# Patient Record
Sex: MALE | Race: WHITE | NOT HISPANIC OR LATINO
[De-identification: names, ages, dates, MRNs, and addresses within clinical notes are randomized per-mention and may not be internally consistent; named-entity substitution may affect disease eponyms.]

---

## 2017-04-11 ENCOUNTER — TRANSCRIPTION ENCOUNTER (OUTPATIENT)
Age: 27
End: 2017-04-11

## 2017-10-14 ENCOUNTER — TRANSCRIPTION ENCOUNTER (OUTPATIENT)
Age: 27
End: 2017-10-14

## 2018-02-12 ENCOUNTER — APPOINTMENT (OUTPATIENT)
Dept: PULMONOLOGY | Facility: CLINIC | Age: 28
End: 2018-02-12
Payer: COMMERCIAL

## 2018-02-12 VITALS
OXYGEN SATURATION: 98 % | DIASTOLIC BLOOD PRESSURE: 60 MMHG | HEIGHT: 69 IN | BODY MASS INDEX: 25.77 KG/M2 | HEART RATE: 84 BPM | WEIGHT: 174 LBS | SYSTOLIC BLOOD PRESSURE: 130 MMHG

## 2018-02-12 DIAGNOSIS — Z82.49 FAMILY HISTORY OF ISCHEMIC HEART DISEASE AND OTHER DISEASES OF THE CIRCULATORY SYSTEM: ICD-10-CM

## 2018-02-12 PROCEDURE — 99204 OFFICE O/P NEW MOD 45 MIN: CPT | Mod: 25

## 2018-02-12 PROCEDURE — 94664 DEMO&/EVAL PT USE INHALER: CPT | Mod: 59

## 2018-02-12 PROCEDURE — 94060 EVALUATION OF WHEEZING: CPT

## 2018-02-12 RX ORDER — AZELASTINE HYDROCHLORIDE AND FLUTICASONE PROPIONATE 137; 50 UG/1; UG/1
137-50 SPRAY, METERED NASAL TWICE DAILY
Qty: 1 | Refills: 5 | Status: DISCONTINUED | COMMUNITY
Start: 2018-02-12 | End: 2018-02-12

## 2018-03-12 ENCOUNTER — RX RENEWAL (OUTPATIENT)
Age: 28
End: 2018-03-12

## 2018-04-02 ENCOUNTER — APPOINTMENT (OUTPATIENT)
Dept: PULMONOLOGY | Facility: CLINIC | Age: 28
End: 2018-04-02
Payer: COMMERCIAL

## 2018-04-02 VITALS
DIASTOLIC BLOOD PRESSURE: 70 MMHG | OXYGEN SATURATION: 95 % | RESPIRATION RATE: 16 BRPM | SYSTOLIC BLOOD PRESSURE: 112 MMHG | HEART RATE: 82 BPM

## 2018-04-02 VITALS — WEIGHT: 178 LBS | BODY MASS INDEX: 26.29 KG/M2

## 2018-04-02 DIAGNOSIS — J32.9 CHRONIC SINUSITIS, UNSPECIFIED: ICD-10-CM

## 2018-04-02 PROCEDURE — 94664 DEMO&/EVAL PT USE INHALER: CPT | Mod: 59

## 2018-04-02 PROCEDURE — 99214 OFFICE O/P EST MOD 30 MIN: CPT | Mod: 25

## 2018-04-02 PROCEDURE — 94060 EVALUATION OF WHEEZING: CPT

## 2018-04-02 RX ORDER — PREDNISONE 10 MG/1
10 TABLET ORAL
Refills: 0 | Status: DISCONTINUED | COMMUNITY
End: 2018-04-02

## 2018-05-01 ENCOUNTER — APPOINTMENT (OUTPATIENT)
Dept: PULMONOLOGY | Facility: CLINIC | Age: 28
End: 2018-05-01
Payer: COMMERCIAL

## 2018-05-01 VITALS — DIASTOLIC BLOOD PRESSURE: 60 MMHG | OXYGEN SATURATION: 97 % | SYSTOLIC BLOOD PRESSURE: 138 MMHG | HEART RATE: 77 BPM

## 2018-05-01 VITALS — WEIGHT: 174 LBS | BODY MASS INDEX: 25.7 KG/M2

## 2018-05-01 PROCEDURE — 94010 BREATHING CAPACITY TEST: CPT

## 2018-05-01 PROCEDURE — 99214 OFFICE O/P EST MOD 30 MIN: CPT | Mod: 25

## 2018-05-01 RX ORDER — PREDNISONE 20 MG/1
20 TABLET ORAL
Qty: 10 | Refills: 0 | Status: DISCONTINUED | COMMUNITY
Start: 2018-03-26 | End: 2018-05-01

## 2018-05-01 RX ORDER — ALPRAZOLAM 0.5 MG/1
0.5 TABLET ORAL
Qty: 45 | Refills: 0 | Status: DISCONTINUED | COMMUNITY
Start: 2017-12-13 | End: 2018-05-01

## 2018-05-01 RX ORDER — ALPRAZOLAM 1 MG/1
1 TABLET ORAL
Qty: 60 | Refills: 0 | Status: ACTIVE | COMMUNITY
Start: 2018-04-12

## 2018-05-01 RX ORDER — PREDNISONE 50 MG/1
50 TABLET ORAL
Qty: 5 | Refills: 0 | Status: DISCONTINUED | COMMUNITY
Start: 2018-02-09 | End: 2018-05-01

## 2018-05-01 RX ORDER — AZITHROMYCIN 250 MG/1
250 TABLET, FILM COATED ORAL
Qty: 6 | Refills: 0 | Status: DISCONTINUED | COMMUNITY
Start: 2018-02-09 | End: 2018-05-01

## 2018-05-01 RX ORDER — CLARITHROMYCIN 500 MG/1
500 TABLET, FILM COATED ORAL
Qty: 20 | Refills: 0 | Status: DISCONTINUED | COMMUNITY
Start: 2018-03-26 | End: 2018-05-01

## 2018-05-01 RX ORDER — ALPRAZOLAM 0.25 MG/1
0.25 TABLET ORAL
Qty: 10 | Refills: 0 | Status: DISCONTINUED | COMMUNITY
Start: 2018-02-06 | End: 2018-05-01

## 2018-05-22 ENCOUNTER — MEDICATION RENEWAL (OUTPATIENT)
Age: 28
End: 2018-05-22

## 2018-08-02 ENCOUNTER — APPOINTMENT (OUTPATIENT)
Dept: PULMONOLOGY | Facility: CLINIC | Age: 28
End: 2018-08-02

## 2018-12-04 ENCOUNTER — APPOINTMENT (OUTPATIENT)
Dept: OTOLARYNGOLOGY | Facility: CLINIC | Age: 28
End: 2018-12-04
Payer: COMMERCIAL

## 2018-12-04 VITALS
WEIGHT: 174 LBS | BODY MASS INDEX: 25.77 KG/M2 | SYSTOLIC BLOOD PRESSURE: 132 MMHG | DIASTOLIC BLOOD PRESSURE: 69 MMHG | OXYGEN SATURATION: 98 % | HEART RATE: 84 BPM | HEIGHT: 69 IN

## 2018-12-04 DIAGNOSIS — J34.2 DEVIATED NASAL SEPTUM: ICD-10-CM

## 2018-12-04 DIAGNOSIS — Z87.09 PERSONAL HISTORY OF OTHER DISEASES OF THE RESPIRATORY SYSTEM: ICD-10-CM

## 2018-12-04 DIAGNOSIS — J32.2 CHRONIC ETHMOIDAL SINUSITIS: ICD-10-CM

## 2018-12-04 PROCEDURE — 99204 OFFICE O/P NEW MOD 45 MIN: CPT | Mod: 25

## 2018-12-04 PROCEDURE — 31231 NASAL ENDOSCOPY DX: CPT

## 2019-01-07 ENCOUNTER — FORM ENCOUNTER (OUTPATIENT)
Age: 29
End: 2019-01-07

## 2019-01-08 ENCOUNTER — OUTPATIENT (OUTPATIENT)
Dept: OUTPATIENT SERVICES | Facility: HOSPITAL | Age: 29
LOS: 1 days | End: 2019-01-08
Payer: COMMERCIAL

## 2019-01-08 ENCOUNTER — APPOINTMENT (OUTPATIENT)
Dept: OTOLARYNGOLOGY | Facility: CLINIC | Age: 29
End: 2019-01-08
Payer: COMMERCIAL

## 2019-01-08 ENCOUNTER — APPOINTMENT (OUTPATIENT)
Dept: CT IMAGING | Facility: HOSPITAL | Age: 29
End: 2019-01-08

## 2019-01-08 VITALS
HEART RATE: 68 BPM | BODY MASS INDEX: 25.77 KG/M2 | SYSTOLIC BLOOD PRESSURE: 148 MMHG | WEIGHT: 174 LBS | HEIGHT: 69 IN | OXYGEN SATURATION: 98 % | DIASTOLIC BLOOD PRESSURE: 74 MMHG

## 2019-01-08 PROBLEM — J34.2 DEVIATED SEPTUM: Status: ACTIVE | Noted: 2019-01-08

## 2019-01-08 PROBLEM — J32.2 SINUSITIS CHRONIC, ETHMOIDAL: Status: ACTIVE | Noted: 2019-01-08

## 2019-01-08 PROCEDURE — 70486 CT MAXILLOFACIAL W/O DYE: CPT

## 2019-01-08 PROCEDURE — 31231 NASAL ENDOSCOPY DX: CPT

## 2019-01-08 PROCEDURE — 70486 CT MAXILLOFACIAL W/O DYE: CPT | Mod: 26

## 2019-01-08 PROCEDURE — 99214 OFFICE O/P EST MOD 30 MIN: CPT | Mod: 25

## 2019-01-08 NOTE — DATA REVIEWED
[de-identified] : 1-8-2019- CT imaging performed today shows right anterior ethmoid sinusitis, thickening of the mucous membrane lining the maxillary sinus, severe deviation of the septum to the right and hypertrophic inferior turbinate on the left which all contributing to the patient's airway obstruction. Both reviewed with patient and family.

## 2019-01-08 NOTE — PROCEDURE
[FreeTextEntry6] : Nasal endoscopy performed under local anesthesia to evaluate the airway and sinuses. This shows we are complete obstruction of the right nose by deviation of the septum and anterior obstruction of the left nose and hypertrophic inferior turbinate. Or significant erythema of the mucous membrane covering the lateral nasal wall which suggests either recent or recovering infection.

## 2019-01-08 NOTE — HISTORY OF PRESENT ILLNESS
[de-identified] : 28-year-old asthmatic patient under the care of Dr. Devries now referred for treatment of episodic sinusitis which consisted of facial pain and purulent posterior rhinorrhea. Patient has seen another surgeon who recommended sinus and some form of nasal surgery. Apparently after finishing 2 weeks of Biaxin he had some improvement but required further care. Currently he feels he feels minimally if at all improved and notes posterior purulent rhinorrhea with cough and nasal airway symptoms. As he has asthma he follows his oxygen saturation and is currently 90-90 one several weeks ago and since has improved to 97%. [FreeTextEntry1] : 1-8-2019- Patient returns for evaluation of acute chronic rhinosinusitis and nasal airway traction. CT reviewed today with patient shows deviation of the septum severely to the right and bilateral anterior ethmoid opacification and antral inferior sinus thickening. Above discussed with patient. As he finds a sinus disease exacerbates his asthma he is anxious to have further care.

## 2019-01-08 NOTE — ASSESSMENT
[FreeTextEntry1] : Patient seen after undergoing CT scan of the sinuses. This shows right anterior ethmoid sinusitis and maxillary sinus disease. Patient has severe deviation of the septum to the right which is essentially blocking his airway. He also has hypertrophy of the left inferior turbinate which contributes to his nasal airway disease. Finally there significant erythema of the mucous membrane of the lateral nasal wall and septum suggesting recent sinus infection.\par \par Given these findings we are recommending the patient begin prednisone for the inflammation and we believe he would benefit from undergoing a septoplasty with submucous resection of the left inferior turbinate and right partial ethmoidectomy and antrostomy. Image guidance will be utilized. Consent given.

## 2019-01-28 ENCOUNTER — APPOINTMENT (OUTPATIENT)
Dept: OTOLARYNGOLOGY | Facility: AMBULATORY SURGERY CENTER | Age: 29
End: 2019-01-28

## 2019-03-19 ENCOUNTER — MEDICATION RENEWAL (OUTPATIENT)
Age: 29
End: 2019-03-19

## 2019-04-15 ENCOUNTER — RX RENEWAL (OUTPATIENT)
Age: 29
End: 2019-04-15

## 2019-04-23 ENCOUNTER — RX RENEWAL (OUTPATIENT)
Age: 29
End: 2019-04-23

## 2019-05-07 ENCOUNTER — OTHER (OUTPATIENT)
Age: 29
End: 2019-05-07

## 2019-05-07 ENCOUNTER — TRANSCRIPTION ENCOUNTER (OUTPATIENT)
Age: 29
End: 2019-05-07

## 2019-05-09 ENCOUNTER — APPOINTMENT (OUTPATIENT)
Dept: PULMONOLOGY | Facility: CLINIC | Age: 29
End: 2019-05-09
Payer: COMMERCIAL

## 2019-05-09 VITALS — HEART RATE: 66 BPM | OXYGEN SATURATION: 98 % | SYSTOLIC BLOOD PRESSURE: 110 MMHG | DIASTOLIC BLOOD PRESSURE: 60 MMHG

## 2019-05-09 DIAGNOSIS — J32.0 CHRONIC MAXILLARY SINUSITIS: ICD-10-CM

## 2019-05-09 PROCEDURE — 99215 OFFICE O/P EST HI 40 MIN: CPT | Mod: 25

## 2019-05-09 PROCEDURE — 94010 BREATHING CAPACITY TEST: CPT

## 2019-05-09 RX ORDER — AMOXICILLIN AND CLAVULANATE POTASSIUM 875; 125 MG/1; MG/1
875-125 TABLET, COATED ORAL TWICE DAILY
Qty: 42 | Refills: 1 | Status: DISCONTINUED | COMMUNITY
Start: 2018-12-04 | End: 2019-05-09

## 2019-05-09 RX ORDER — AMOXICILLIN AND CLAVULANATE POTASSIUM 875; 125 MG/1; MG/1
875-125 TABLET, COATED ORAL
Qty: 20 | Refills: 1 | Status: DISCONTINUED | COMMUNITY
Start: 2019-01-08 | End: 2019-05-09

## 2019-05-09 RX ORDER — PREDNISONE 10 MG/1
10 TABLET ORAL
Qty: 25 | Refills: 1 | Status: DISCONTINUED | COMMUNITY
Start: 2018-12-04 | End: 2019-05-09

## 2019-05-09 RX ORDER — PREDNISONE 10 MG/1
10 TABLET ORAL
Qty: 25 | Refills: 1 | Status: DISCONTINUED | COMMUNITY
Start: 2019-01-08 | End: 2019-05-09

## 2019-05-09 RX ORDER — KETOROLAC TROMETHAMINE 10 MG/1
10 TABLET, FILM COATED ORAL 3 TIMES DAILY
Qty: 15 | Refills: 1 | Status: DISCONTINUED | COMMUNITY
Start: 2019-01-08 | End: 2019-05-09

## 2019-05-09 NOTE — CONSULT LETTER
[Dear  ___] : Dear  [unfilled], [Please see my note below.] : Please see my note below. [Courtesy Letter:] : I had the pleasure of seeing your patient, [unfilled], in my office today. [Sincerely,] : Sincerely, [Federico Garza MD] : Federico Garza MD

## 2019-05-09 NOTE — PHYSICAL EXAM
[Normal Appearance] : normal appearance [General Appearance - Well Developed] : well developed [Well Groomed] : well groomed [General Appearance - Well Nourished] : well nourished [General Appearance - In No Acute Distress] : no acute distress [No Deformities] : no deformities [Eyelids - No Xanthelasma] : the eyelids demonstrated no xanthelasmas [Normal Oropharynx] : normal oropharynx [Normal Conjunctiva] : the conjunctiva exhibited no abnormalities [Neck Appearance] : the appearance of the neck was normal [Jugular Venous Distention Increased] : there was no jugular-venous distention [Neck Cervical Mass (___cm)] : no neck mass was observed [Thyroid Diffuse Enlargement] : the thyroid was not enlarged [Thyroid Nodule] : there were no palpable thyroid nodules [Heart Rate And Rhythm] : heart rate and rhythm were normal [Heart Sounds] : normal S1 and S2 [Murmurs] : no murmurs present [Respiration, Rhythm And Depth] : normal respiratory rhythm and effort [Exaggerated Use Of Accessory Muscles For Inspiration] : no accessory muscle use [Auscultation Breath Sounds / Voice Sounds] : lungs were clear to auscultation bilaterally [Abdomen Soft] : soft [Abdomen Mass (___ Cm)] : no abdominal mass palpated [Abdomen Tenderness] : non-tender [Abnormal Walk] : normal gait [Gait - Sufficient For Exercise Testing] : the gait was sufficient for exercise testing [Petechial Hemorrhages (___cm)] : no petechial hemorrhages [Nail Clubbing] : no clubbing of the fingernails [Cyanosis, Localized] : no localized cyanosis [Skin Color & Pigmentation] : normal skin color and pigmentation [] : no rash [No Venous Stasis] : no venous stasis [Skin Lesions] : no skin lesions [No Skin Ulcers] : no skin ulcer [Sensation] : the sensory exam was normal to light touch and pinprick [Deep Tendon Reflexes (DTR)] : deep tendon reflexes were 2+ and symmetric [No Xanthoma] : no  xanthoma was observed [No Focal Deficits] : no focal deficits

## 2019-05-09 NOTE — PROCEDURE
[___%] : personal best [unfilled]U% [___%] : last visit [unfilled]U% [Spirometry] : improving [FreeTextEntry1] : IGE ~1200\par \par EOS: 5.3%\par \par \par \par \par \par EXAM: CT SINUSES \par \par PROCEDURE DATE: 01/08/2019 \par \par \par \par INTERPRETATION: Technique: A subcentimeter axial acquisition was obtained \par through the paranasal sinuses utilizing navigational protocol. Axial, \par sagittal and coronal reformatted images were created. \par \par Contrast: None. \par \par Clinical Information: Sinusitis. Post treatment. \par \par Prior Studies: CT maxillofacial dated 8/21/2015. \par \par Findings: \par \par * Prior Surgery: None. \par \par * Sinuses: There is mucosal thickening in the alveolar recesses of the \par maxillary sinuses and within a few anterior ethmoidal air cells bilaterally. \par There is a small retention cyst versus polyp in the right sphenoid sinus. \par Degree and distribution is similar on comparison with the prior \par maxillofacial CT. \par \par * Sinocranial and Sino-orbital Junctions: The anterior skull base is \par unremarkable. The orbital walls are intact. The sphenoid septum inserts on \par the left carotid canal. \par \par * Ostiomeatal Units: The right ostiomeatal unit is obstructed secondary to \par mucosal thickening in the maxillary ostium and proximal infundibulum. There \par is narrowing of the bilateral maxillary ostia and proximal infundibula \par secondary to prominent infraorbital ethmoidal cells (Natali). \par \par * Frontal Sinus Outflow Tracts: There is narrowing of the bilateral frontal \par sinus outflow tracts by anterior frontoethmoidal cell pneumatization, with \par obstruction on the left secondary to superimposed mucosal thickening. \par \par * Sphenoethmoidal Recesses: Scant mucosal thickening is present at the \par sphenoid ostia. \par \par * Maxillary Teeth: No evidence of contributory odontogenic inflammatory \par disease. \par \par * Nasal Cavity/Nasopharynx: The nasal septum is deviated to the right with \par a bony septal spur that contacts the medial surface of the right inferior \par turbinate. There is pneumatization of the left middle turbinate. No nasal \par polyps or masses. The nasopharynx is unremarkable. \par \par * Orbits: Unremarkable. \par \par * Brain: No hydrocephalus or large intracranial mass lesion. \par \par * Mastoids: Well aerated. \par \par \par IMPRESSION: Mild mucosal thickening in the bilateral maxillary sinuses and \par anterior ethmoidal air cells, as above. There are both soft tissue and \par osseous sites of narrowing of the anterior drainage pathways. There is \par prominent rightward septal deviation with intranasal contact point. \par \par Thank you for the opportunity to participate in the care of this patient." \par \par TANYA PEREZ M.D., RADIOLOGY RESIDENT \par This document has been electronically signed. \par GEORGINA CALDERON M.D., ATTENDING RADIOLOGIST \par This document has been electronically signed. Jan 9 2019 4:46PM \par Chest CAT scan reviewed on PACS with the patient.\par

## 2019-05-09 NOTE — HISTORY OF PRESENT ILLNESS
[Moderate Persistent] : moderate persistent [de-identified] : allergies and seasonal rhinitis [de-identified] : Self discontinued  Anoro  > 6 months ago. Ran out of Arnuity. Sufferred exacerbation 5/7/19 and saw [facility]  and given 50mg /day with improvement.

## 2019-05-09 NOTE — DISCUSSION/SUMMARY
[Improving] : improving [Asthma] : asthma [Medication Changes Per Orders] : Medication changes are as documented in orders [Responding to Treatment] : responding to treatment [Moderate Persistent] : moderate persistent [de-identified] : resume sinus meds. Considering Dupixent

## 2019-06-14 ENCOUNTER — RX RENEWAL (OUTPATIENT)
Age: 29
End: 2019-06-14

## 2019-07-11 ENCOUNTER — APPOINTMENT (OUTPATIENT)
Dept: PULMONOLOGY | Facility: CLINIC | Age: 29
End: 2019-07-11
Payer: COMMERCIAL

## 2019-07-11 VITALS — SYSTOLIC BLOOD PRESSURE: 110 MMHG | OXYGEN SATURATION: 96 % | HEART RATE: 80 BPM | DIASTOLIC BLOOD PRESSURE: 60 MMHG

## 2019-07-11 VITALS — BODY MASS INDEX: 25.84 KG/M2 | WEIGHT: 175 LBS

## 2019-07-11 PROCEDURE — 99214 OFFICE O/P EST MOD 30 MIN: CPT | Mod: 25

## 2019-07-11 PROCEDURE — 94010 BREATHING CAPACITY TEST: CPT

## 2019-07-11 NOTE — PHYSICAL EXAM
[Well Groomed] : well groomed [General Appearance - Well Developed] : well developed [Normal Appearance] : normal appearance [General Appearance - Well Nourished] : well nourished [General Appearance - In No Acute Distress] : no acute distress [No Deformities] : no deformities [Eyelids - No Xanthelasma] : the eyelids demonstrated no xanthelasmas [Normal Conjunctiva] : the conjunctiva exhibited no abnormalities [Normal Oropharynx] : normal oropharynx [Neck Cervical Mass (___cm)] : no neck mass was observed [Neck Appearance] : the appearance of the neck was normal [Thyroid Diffuse Enlargement] : the thyroid was not enlarged [Thyroid Nodule] : there were no palpable thyroid nodules [Jugular Venous Distention Increased] : there was no jugular-venous distention [Murmurs] : no murmurs present [Heart Sounds] : normal S1 and S2 [Heart Rate And Rhythm] : heart rate and rhythm were normal [Auscultation Breath Sounds / Voice Sounds] : lungs were clear to auscultation bilaterally [Exaggerated Use Of Accessory Muscles For Inspiration] : no accessory muscle use [Respiration, Rhythm And Depth] : normal respiratory rhythm and effort [Abdomen Tenderness] : non-tender [Abdomen Soft] : soft [Gait - Sufficient For Exercise Testing] : the gait was sufficient for exercise testing [Abnormal Walk] : normal gait [Abdomen Mass (___ Cm)] : no abdominal mass palpated [Cyanosis, Localized] : no localized cyanosis [Petechial Hemorrhages (___cm)] : no petechial hemorrhages [Nail Clubbing] : no clubbing of the fingernails [Skin Color & Pigmentation] : normal skin color and pigmentation [No Skin Ulcers] : no skin ulcer [No Venous Stasis] : no venous stasis [] : no rash [Skin Lesions] : no skin lesions [No Xanthoma] : no  xanthoma was observed [Deep Tendon Reflexes (DTR)] : deep tendon reflexes were 2+ and symmetric [Sensation] : the sensory exam was normal to light touch and pinprick [No Focal Deficits] : no focal deficits

## 2019-07-11 NOTE — CONSULT LETTER
[Please see my note below.] : Please see my note below. [Dear  ___] : Dear  [unfilled], [Courtesy Letter:] : I had the pleasure of seeing your patient, [unfilled], in my office today. [Federico Garza MD] : Federico Garza MD [Sincerely,] : Sincerely,

## 2019-07-11 NOTE — DISCUSSION/SUMMARY
[Asthma] : asthma [Moderate Persistent] : moderate persistent [Improving] : improving [Responding to Treatment] : responding to treatment [Medication Changes Per Orders] : Medication changes are as documented in orders [de-identified] :  Considering Dupixent

## 2019-07-11 NOTE — PROCEDURE
[___%] : current visit [unfilled]U% [Spirometry] : stable [FreeTextEntry1] : IGE ~1200\par \par EOS: 5.3%\par \par \par \par \par \par EXAM: CT SINUSES \par \par PROCEDURE DATE: 01/08/2019 \par \par \par \par INTERPRETATION: Technique: A subcentimeter axial acquisition was obtained \par through the paranasal sinuses utilizing navigational protocol. Axial, \par sagittal and coronal reformatted images were created. \par \par Contrast: None. \par \par Clinical Information: Sinusitis. Post treatment. \par \par Prior Studies: CT maxillofacial dated 8/21/2015. \par \par Findings: \par \par * Prior Surgery: None. \par \par * Sinuses: There is mucosal thickening in the alveolar recesses of the \par maxillary sinuses and within a few anterior ethmoidal air cells bilaterally. \par There is a small retention cyst versus polyp in the right sphenoid sinus. \par Degree and distribution is similar on comparison with the prior \par maxillofacial CT. \par \par * Sinocranial and Sino-orbital Junctions: The anterior skull base is \par unremarkable. The orbital walls are intact. The sphenoid septum inserts on \par the left carotid canal. \par \par * Ostiomeatal Units: The right ostiomeatal unit is obstructed secondary to \par mucosal thickening in the maxillary ostium and proximal infundibulum. There \par is narrowing of the bilateral maxillary ostia and proximal infundibula \par secondary to prominent infraorbital ethmoidal cells (Natali). \par \par * Frontal Sinus Outflow Tracts: There is narrowing of the bilateral frontal \par sinus outflow tracts by anterior frontoethmoidal cell pneumatization, with \par obstruction on the left secondary to superimposed mucosal thickening. \par \par * Sphenoethmoidal Recesses: Scant mucosal thickening is present at the \par sphenoid ostia. \par \par * Maxillary Teeth: No evidence of contributory odontogenic inflammatory \par disease. \par \par * Nasal Cavity/Nasopharynx: The nasal septum is deviated to the right with \par a bony septal spur that contacts the medial surface of the right inferior \par turbinate. There is pneumatization of the left middle turbinate. No nasal \par polyps or masses. The nasopharynx is unremarkable. \par \par * Orbits: Unremarkable. \par \par * Brain: No hydrocephalus or large intracranial mass lesion. \par \par * Mastoids: Well aerated. \par \par \par IMPRESSION: Mild mucosal thickening in the bilateral maxillary sinuses and \par anterior ethmoidal air cells, as above. There are both soft tissue and \par osseous sites of narrowing of the anterior drainage pathways. There is \par prominent rightward septal deviation with intranasal contact point. \par \par Thank you for the opportunity to participate in the care of this patient." \par \par TANYA PEREZ M.D., RADIOLOGY RESIDENT \par This document has been electronically signed. \par GEORGINA CALDERON M.D., ATTENDING RADIOLOGIST \par This document has been electronically signed. Jan 9 2019 4:46PM \par Chest CAT scan reviewed on PACS with the patient.\par

## 2019-07-11 NOTE — HISTORY OF PRESENT ILLNESS
[Moderate Persistent] : moderate persistent [Well Controlled] : Well controlled [None] : The patient is currently asymptomatic [Adherent] : the patient is adherent with ~his/her~ medication regimen [de-identified] : allergies and seasonal rhinitis

## 2019-12-17 RX ORDER — FLUTICASONE FUROATE 200 UG/1
200 POWDER RESPIRATORY (INHALATION) DAILY
Qty: 3 | Refills: 3 | Status: DISCONTINUED | COMMUNITY
Start: 2018-02-12 | End: 2019-12-17

## 2020-03-02 ENCOUNTER — TRANSCRIPTION ENCOUNTER (OUTPATIENT)
Age: 30
End: 2020-03-02

## 2020-03-31 ENCOUNTER — APPOINTMENT (OUTPATIENT)
Dept: PULMONOLOGY | Facility: CLINIC | Age: 30
End: 2020-03-31
Payer: COMMERCIAL

## 2020-03-31 DIAGNOSIS — J45.909 UNSPECIFIED ASTHMA, UNCOMPLICATED: ICD-10-CM

## 2020-03-31 PROCEDURE — 99213 OFFICE O/P EST LOW 20 MIN: CPT

## 2020-03-31 NOTE — PHYSICAL EXAM
[No Acute Distress] : no acute distress [Well Nourished] : well nourished [Normal Appearance] : normal appearance [Well Groomed] : well groomed

## 2020-03-31 NOTE — DISCUSSION/SUMMARY
[FreeTextEntry1] : 30-year-old male, seen today via TeleHealth for his moderate persistent asthma, as well as questions regarding Covid 19 virus. Patient appears to be asymptomatic with good control of his underlying asthma. Questions were answered regarding his risks and preventative measures for Covid 19 protection.

## 2020-03-31 NOTE — HISTORY OF PRESENT ILLNESS
[Home] : at home, [unfilled] , at the time of the visit. [Medical Office: ([facility])___] : at ~his/her~ medical office located in [place] [Patient] : the patient [TextBox_4] : Telemedicine visit was made after patient consent. He was last seen in this office on 7/11/19. Patient has moderate persistent asthma presently controlled on Qvar, as well as p.r.n. albuterol/ipratropium nebulization, Singulair, Ventolin. He is using his nebulizer 2-3 times a week habitually has had no significant complaints of increased cough, wheeze, sputum, fevers, chills, chest pains. He is overly concerned about the Covid 19 crisis. He has little exposure risk. 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 His girlfriend whom he is living with is a nurse and has been exposed to Covid 19, but is asymptomatic.

## 2020-10-29 DIAGNOSIS — Z23 ENCOUNTER FOR IMMUNIZATION: ICD-10-CM

## 2020-12-11 RX ORDER — ALBUTEROL SULFATE 90 UG/1
108 (90 BASE) AEROSOL, METERED RESPIRATORY (INHALATION)
Qty: 3 | Refills: 3 | Status: ACTIVE | COMMUNITY
Start: 2018-02-12 | End: 1900-01-01

## 2021-01-04 ENCOUNTER — APPOINTMENT (OUTPATIENT)
Dept: PULMONOLOGY | Facility: CLINIC | Age: 31
End: 2021-01-04
Payer: COMMERCIAL

## 2021-01-04 VITALS
OXYGEN SATURATION: 98 % | HEART RATE: 103 BPM | DIASTOLIC BLOOD PRESSURE: 68 MMHG | WEIGHT: 223 LBS | BODY MASS INDEX: 32.93 KG/M2 | SYSTOLIC BLOOD PRESSURE: 112 MMHG

## 2021-01-04 PROCEDURE — 99072 ADDL SUPL MATRL&STAF TM PHE: CPT

## 2021-01-04 PROCEDURE — 99215 OFFICE O/P EST HI 40 MIN: CPT

## 2021-01-04 RX ORDER — NORTRIPTYLINE HYDROCHLORIDE 10 MG/1
10 CAPSULE ORAL
Qty: 30 | Refills: 0 | Status: DISCONTINUED | COMMUNITY
Start: 2018-11-28 | End: 2021-01-04

## 2021-01-04 RX ORDER — BUTALBITAL, ACETAMINOPHEN AND CAFFEINE 325; 50; 40 MG/1; MG/1; MG/1
50-325-40 TABLET ORAL
Qty: 60 | Refills: 0 | Status: DISCONTINUED | COMMUNITY
Start: 2019-01-23 | End: 2021-01-04

## 2021-01-04 RX ORDER — BECLOMETHASONE DIPROPIONATE HFA 80 UG/1
80 AEROSOL, METERED RESPIRATORY (INHALATION)
Qty: 1 | Refills: 5 | Status: DISCONTINUED | COMMUNITY
Start: 2019-12-17 | End: 2021-01-04

## 2021-01-04 RX ORDER — IPRATROPIUM BROMIDE AND ALBUTEROL SULFATE 2.5; .5 MG/3ML; MG/3ML
0.5-2.5 (3) SOLUTION RESPIRATORY (INHALATION) 4 TIMES DAILY
Qty: 1 | Refills: 2 | Status: ACTIVE | COMMUNITY
Start: 1900-01-01 | End: 1900-01-01

## 2021-01-04 RX ORDER — UMECLIDINIUM BROMIDE AND VILANTEROL TRIFENATATE 62.5; 25 UG/1; UG/1
62.5-25 POWDER RESPIRATORY (INHALATION) DAILY
Qty: 3 | Refills: 3 | Status: DISCONTINUED | COMMUNITY
Start: 2018-04-02 | End: 2021-01-04

## 2021-01-04 NOTE — HISTORY OF PRESENT ILLNESS
[Mild Persistent] : mild persistent [Not Well Controlled] : Not well controlled [Wheezing] : worsened wheezing [Cough] : worsened coughing [Shortness Of Breath] : worsened shortness of breath [Chest Tightness Or Heavy Pressure] : worsened chest tightness [Feelings Of Weakness On Exertion] : worsened reduced exercise tolerance [Cough at Night] : awakened at night because of cough [Wheezing at Night] : awakened at night because of wheezing or trouble breathing [Every ___ Hours] : of every [unfilled] hour(s) [Obstructive Sleep Apnea] : obstructive sleep apnea [Awakes Unrefreshed] : awakes unrefreshed [Daytime Somnolence] : daytime somnolence [Fatigue] : fatigue [Frequent Nocturnal Awakening] : frequent nocturnal awakening [Nonrestorative Sleep] : nonrestorative sleep [Snoring] : snoring [Witnessed Apneas] : witnessed apneas [Adherent] : the patient is not adherent with ~his/her~ medication regimen [de-identified] : allergies and seasonal rhinitis [de-identified] : worse x 6 months [Awakes with Dry Mouth] : does not awaken with dry mouth [TextBox_77] : 9508 [TextBox_79] : 1934 [TextBox_81] : 5 [TextBox_89] : 0

## 2021-01-04 NOTE — DISCUSSION/SUMMARY
[Asthma] : asthma [Responding to Treatment] : responding to treatment [Moderate Persistent] : moderate persistent [Medication Changes Per Orders] : Medication changes are as documented in orders [Decompensated] : decompensated [de-identified] : advised to increase compliance [Obstructive Sleep Apnea] : obstructive sleep apnea [Sleep Study] : sleep study [Alcohol Avoidance] : alcohol avoidance [Sedative Avoidance] : sedative avoidance [Weight Loss Program] : weight loss program [Patient] : discussed with the patient

## 2021-01-12 ENCOUNTER — OUTPATIENT (OUTPATIENT)
Dept: OUTPATIENT SERVICES | Facility: HOSPITAL | Age: 31
LOS: 1 days | End: 2021-01-12
Payer: COMMERCIAL

## 2021-01-12 DIAGNOSIS — G47.33 OBSTRUCTIVE SLEEP APNEA (ADULT) (PEDIATRIC): ICD-10-CM

## 2021-01-12 PROCEDURE — G0399: CPT

## 2021-01-12 PROCEDURE — 95806 SLEEP STUDY UNATT&RESP EFFT: CPT | Mod: 26

## 2021-01-12 PROCEDURE — 95806 SLEEP STUDY UNATT&RESP EFFT: CPT

## 2021-02-08 ENCOUNTER — APPOINTMENT (OUTPATIENT)
Dept: PULMONOLOGY | Facility: CLINIC | Age: 31
End: 2021-02-08
Payer: COMMERCIAL

## 2021-02-08 VITALS
BODY MASS INDEX: 33.03 KG/M2 | SYSTOLIC BLOOD PRESSURE: 120 MMHG | TEMPERATURE: 98.2 F | HEART RATE: 96 BPM | RESPIRATION RATE: 16 BRPM | HEIGHT: 69 IN | OXYGEN SATURATION: 98 % | DIASTOLIC BLOOD PRESSURE: 80 MMHG | WEIGHT: 223 LBS

## 2021-02-08 PROCEDURE — 99214 OFFICE O/P EST MOD 30 MIN: CPT

## 2021-02-08 PROCEDURE — 99072 ADDL SUPL MATRL&STAF TM PHE: CPT

## 2021-02-08 RX ORDER — PREDNISONE 10 MG/1
10 TABLET ORAL
Qty: 42 | Refills: 0 | Status: DISCONTINUED | COMMUNITY
Start: 2021-01-04 | End: 2021-02-08

## 2021-02-08 RX ORDER — MONTELUKAST 10 MG/1
10 TABLET, FILM COATED ORAL
Qty: 90 | Refills: 3 | Status: DISCONTINUED | COMMUNITY
Start: 1900-01-01 | End: 2021-02-08

## 2021-02-08 NOTE — DISCUSSION/SUMMARY
[Asthma] : asthma [Decompensated] : decompensated [Responding to Treatment] : responding to treatment [Moderate Persistent] : moderate persistent [Medication Changes Per Orders] : Medication changes are as documented in orders [Obstructive Sleep Apnea] : obstructive sleep apnea [Sleep Study] : sleep study [Alcohol Avoidance] : alcohol avoidance [Sedative Avoidance] : sedative avoidance [Weight Loss Program] : weight loss program [Patient] : discussed with the patient [de-identified] : Portable monitor sleep study, most likely under predicted the severity of his sleep apnea secondary to his frequent awakenings [de-identified] : Resmed Airsense 10 autoset (for her) in a range 4-16 with N20 mask

## 2021-02-08 NOTE — HISTORY OF PRESENT ILLNESS
[Obstructive Sleep Apnea] : obstructive sleep apnea [Awakes Unrefreshed] : awakes unrefreshed [Awakes with Dry Mouth] : does not awaken with dry mouth [Daytime Somnolence] : daytime somnolence [Fatigue] : fatigue [Frequent Nocturnal Awakening] : frequent nocturnal awakening [Nonrestorative Sleep] : nonrestorative sleep [Snoring] : snoring [Witnessed Apneas] : witnessed apneas [TextBox_77] : 3510 [TextBox_79] : 5397 [TextBox_81] : 5 [TextBox_89] : 0 [TextBox_165] : Pt seen s/p HST [ESS] : 0 [Mild Persistent] : mild persistent [Not Well Controlled] : Not well controlled [Wheezing] : worsened wheezing [Cough] : worsened coughing [Shortness Of Breath] : worsened shortness of breath [Chest Tightness Or Heavy Pressure] : worsened chest tightness [Cough at Night] : awakened at night because of cough [Feelings Of Weakness On Exertion] : worsened reduced exercise tolerance [Wheezing at Night] : awakened at night because of wheezing or trouble breathing [Every ___ Hours] : of every [unfilled] hour(s) [Adherent] : the patient is not adherent with ~his/her~ medication regimen [de-identified] : allergies and seasonal rhinitis [de-identified] : worse x 6 months

## 2021-02-08 NOTE — PHYSICAL EXAM
[No Acute Distress] : no acute distress [Normal Oropharynx] : normal oropharynx [Normal Appearance] : normal appearance [No Neck Mass] : no neck mass [Normal Rate/Rhythm] : normal rate/rhythm [Normal S1, S2] : normal s1, s2 [No Murmurs] : no murmurs [Wheeze] : wheeze [No Abnormalities] : no abnormalities [Benign] : benign [Normal Gait] : normal gait [No Clubbing] : no clubbing [No Edema] : no edema [No Cyanosis] : no cyanosis [FROM] : FROM [Normal Color/ Pigmentation] : normal color/ pigmentation [No Focal Deficits] : no focal deficits [Oriented x3] : oriented x3 [Normal Affect] : normal affect

## 2021-04-05 ENCOUNTER — APPOINTMENT (OUTPATIENT)
Dept: PULMONOLOGY | Facility: CLINIC | Age: 31
End: 2021-04-05

## 2021-07-30 ENCOUNTER — OUTPATIENT (OUTPATIENT)
Dept: OUTPATIENT SERVICES | Facility: HOSPITAL | Age: 31
LOS: 1 days | End: 2021-07-30
Payer: COMMERCIAL

## 2021-07-30 DIAGNOSIS — Z20.828 CONTACT WITH AND (SUSPECTED) EXPOSURE TO OTHER VIRAL COMMUNICABLE DISEASES: ICD-10-CM

## 2021-07-30 LAB — SARS-COV-2 RNA SPEC QL NAA+PROBE: SIGNIFICANT CHANGE UP

## 2021-07-30 PROCEDURE — U0003: CPT

## 2021-07-30 PROCEDURE — C9803: CPT

## 2021-07-30 PROCEDURE — U0005: CPT

## 2021-07-31 DIAGNOSIS — Z20.828 CONTACT WITH AND (SUSPECTED) EXPOSURE TO OTHER VIRAL COMMUNICABLE DISEASES: ICD-10-CM

## 2021-10-19 ENCOUNTER — APPOINTMENT (OUTPATIENT)
Dept: PULMONOLOGY | Facility: CLINIC | Age: 31
End: 2021-10-19
Payer: COMMERCIAL

## 2021-10-19 ENCOUNTER — TRANSCRIPTION ENCOUNTER (OUTPATIENT)
Age: 31
End: 2021-10-19

## 2021-10-19 ENCOUNTER — APPOINTMENT (OUTPATIENT)
Dept: RADIOLOGY | Facility: CLINIC | Age: 31
End: 2021-10-19
Payer: COMMERCIAL

## 2021-10-19 ENCOUNTER — OUTPATIENT (OUTPATIENT)
Dept: OUTPATIENT SERVICES | Facility: HOSPITAL | Age: 31
LOS: 1 days | End: 2021-10-19
Payer: COMMERCIAL

## 2021-10-19 VITALS
DIASTOLIC BLOOD PRESSURE: 70 MMHG | OXYGEN SATURATION: 98 % | RESPIRATION RATE: 16 BRPM | HEART RATE: 78 BPM | SYSTOLIC BLOOD PRESSURE: 120 MMHG

## 2021-10-19 DIAGNOSIS — J45.901 UNSPECIFIED ASTHMA WITH (ACUTE) EXACERBATION: ICD-10-CM

## 2021-10-19 DIAGNOSIS — G47.33 OBSTRUCTIVE SLEEP APNEA (ADULT) (PEDIATRIC): ICD-10-CM

## 2021-10-19 DIAGNOSIS — J45.51 SEVERE PERSISTENT ASTHMA WITH (ACUTE) EXACERBATION: ICD-10-CM

## 2021-10-19 PROCEDURE — 99215 OFFICE O/P EST HI 40 MIN: CPT

## 2021-10-19 PROCEDURE — 71046 X-RAY EXAM CHEST 2 VIEWS: CPT | Mod: 26

## 2021-10-19 PROCEDURE — 71046 X-RAY EXAM CHEST 2 VIEWS: CPT

## 2021-10-19 RX ORDER — ALBUTEROL SULFATE 2.5 MG/3ML
(2.5 MG/3ML) SOLUTION RESPIRATORY (INHALATION)
Qty: 1 | Refills: 3 | Status: ACTIVE | COMMUNITY
Start: 2018-03-26 | End: 1900-01-01

## 2021-10-19 RX ORDER — FLUTICASONE PROPIONATE 44 UG/1
44 AEROSOL, METERED RESPIRATORY (INHALATION)
Qty: 3 | Refills: 0 | Status: DISCONTINUED | COMMUNITY
Start: 2021-01-04 | End: 2021-10-19

## 2021-10-19 NOTE — HISTORY OF PRESENT ILLNESS
[Obstructive Sleep Apnea] : obstructive sleep apnea [Awakes Unrefreshed] : awakes unrefreshed [Daytime Somnolence] : daytime somnolence [Fatigue] : fatigue [Frequent Nocturnal Awakening] : frequent nocturnal awakening [Nonrestorative Sleep] : nonrestorative sleep [Snoring] : snoring [Witnessed Apneas] : witnessed apneas [Home] : home [Mild Persistent] : mild persistent [Not Well Controlled] : Not well controlled [Wheezing] : worsened wheezing [Cough] : worsened coughing [Shortness Of Breath] : worsened shortness of breath [Chest Tightness Or Heavy Pressure] : worsened chest tightness [Feelings Of Weakness On Exertion] : worsened reduced exercise tolerance [Cough at Night] : awakened at night because of cough [Wheezing at Night] : awakened at night because of wheezing or trouble breathing [Every ___ Hours] : of every [unfilled] hour(s) [Adherent] : the patient is not adherent with ~his/her~ medication regimen [de-identified] : allergies and seasonal rhinitis [de-identified] : only using Flovent , insurance not approving. Worsening symptoms over past 3 months. ? exposure  [de-identified] : Recent dx low testosterone [TextBox_4] : 31-year-old male with a history of mild obstructive sleep apnea as well as severe persistent asthma with an elevated IgE seen today in an urgent visit.  Patient has not been seen in this office for 6 months and has been poorly compliant with his drug regimen.  His Anoro was not approved by his insurance company and he subsequently did not contact me for further instructions.  Over the course of the last 3 months he noted increased symptoms of cough and wheeze with profound shortness of breath.  No associated sinus headaches chest pains palpitations lightheadedness or dizziness.  He does state that it is a new exposure in his apartment building.  He currently is having a response to his short acting bronchodilator and remains on a nonprescribed low-dose Flovent [Awakes with Dry Mouth] : does not awaken with dry mouth [TextBox_77] : 5624 [TextBox_79] : 9514 [TextBox_81] : 5 [TextBox_89] : 0 [TextBox_100] : 1/12/21 [TextBox_108] : 8.6 [TextBox_116] : 82 [TextBox_165] : Pt seen s/p HST [ESS] : 0

## 2021-10-19 NOTE — DISCUSSION/SUMMARY
[Asthma] : asthma [Decompensated] : decompensated [Medication Changes Per Orders] : Medication changes are as documented in orders [Obstructive Sleep Apnea] : obstructive sleep apnea [Sleep Study] : sleep study [Alcohol Avoidance] : alcohol avoidance [Sedative Avoidance] : sedative avoidance [Weight Loss Program] : weight loss program [Patient] : discussed with the patient [Severe Persistent] : severe persistent [CBC] : complete blood count [PFTs] : pulmonary function tests [Chest X-Ray] : chest x-ray [de-identified] : Secondary to noncompliance [de-identified] : IgE [de-identified] : Resmed Airsense 10 autoset in a range 4-16 with N20 mask encourage use of CPAP [de-identified] : Portable monitor sleep study, most likely under predicted the severity of his sleep apnea secondary to his frequent awakenings

## 2021-10-19 NOTE — CONSULT LETTER
Retinal tear and detachment warning symptoms reviewed and patient instructed to call immediately if increasing floaters, flashes, or decreasing peripheral vision. [Dear  ___] : Dear  [unfilled], [Courtesy Letter:] : I had the pleasure of seeing your patient, [unfilled], in my office today. [Please see my note below.] : Please see my note below. [Sincerely,] : Sincerely,

## 2021-10-20 LAB
ALBUMIN SERPL ELPH-MCNC: 4.9 G/DL
ALP BLD-CCNC: 52 U/L
ALT SERPL-CCNC: 21 U/L
ANION GAP SERPL CALC-SCNC: 16 MMOL/L
AST SERPL-CCNC: 20 U/L
BASOPHILS # BLD AUTO: 0.07 K/UL
BASOPHILS NFR BLD AUTO: 0.8 %
BILIRUB SERPL-MCNC: 1 MG/DL
BUN SERPL-MCNC: 12 MG/DL
CALCIUM SERPL-MCNC: 9.7 MG/DL
CHLORIDE SERPL-SCNC: 106 MMOL/L
CO2 SERPL-SCNC: 19 MMOL/L
CREAT SERPL-MCNC: 1.18 MG/DL
EOSINOPHIL # BLD AUTO: 0.23 K/UL
EOSINOPHIL NFR BLD AUTO: 2.6 %
GLUCOSE SERPL-MCNC: 94 MG/DL
HCT VFR BLD CALC: 48.4 %
HGB BLD-MCNC: 16.2 G/DL
IMM GRANULOCYTES NFR BLD AUTO: 0.3 %
LYMPHOCYTES # BLD AUTO: 2.07 K/UL
LYMPHOCYTES NFR BLD AUTO: 23.8 %
MAN DIFF?: NORMAL
MCHC RBC-ENTMCNC: 29 PG
MCHC RBC-ENTMCNC: 33.5 GM/DL
MCV RBC AUTO: 86.7 FL
MONOCYTES # BLD AUTO: 0.68 K/UL
MONOCYTES NFR BLD AUTO: 7.8 %
NEUTROPHILS # BLD AUTO: 5.61 K/UL
NEUTROPHILS NFR BLD AUTO: 64.7 %
PLATELET # BLD AUTO: 207 K/UL
POTASSIUM SERPL-SCNC: 4.4 MMOL/L
PROT SERPL-MCNC: 7.4 G/DL
RBC # BLD: 5.58 M/UL
RBC # FLD: 13.5 %
SODIUM SERPL-SCNC: 140 MMOL/L
TOTAL IGE SMQN RAST: 401 KU/L
WBC # FLD AUTO: 8.69 K/UL

## 2021-11-15 DIAGNOSIS — Z01.818 ENCOUNTER FOR OTHER PREPROCEDURAL EXAMINATION: ICD-10-CM

## 2021-11-16 ENCOUNTER — APPOINTMENT (OUTPATIENT)
Dept: DISASTER EMERGENCY | Facility: CLINIC | Age: 31
End: 2021-11-16

## 2021-11-19 ENCOUNTER — APPOINTMENT (OUTPATIENT)
Dept: PULMONOLOGY | Facility: CLINIC | Age: 31
End: 2021-11-19

## 2021-12-10 ENCOUNTER — APPOINTMENT (OUTPATIENT)
Dept: PULMONOLOGY | Facility: CLINIC | Age: 31
End: 2021-12-10

## 2023-03-02 ENCOUNTER — LABORATORY RESULT (OUTPATIENT)
Age: 33
End: 2023-03-02

## 2023-03-03 ENCOUNTER — NON-APPOINTMENT (OUTPATIENT)
Age: 33
End: 2023-03-03

## 2023-03-03 ENCOUNTER — APPOINTMENT (OUTPATIENT)
Dept: ANTEPARTUM | Facility: CLINIC | Age: 33
End: 2023-03-03
Payer: COMMERCIAL

## 2023-03-03 PROCEDURE — 36415 COLL VENOUS BLD VENIPUNCTURE: CPT

## 2023-11-28 ENCOUNTER — APPOINTMENT (OUTPATIENT)
Dept: UROLOGY | Facility: CLINIC | Age: 33
End: 2023-11-28
Payer: COMMERCIAL

## 2023-11-28 VITALS
WEIGHT: 200 LBS | SYSTOLIC BLOOD PRESSURE: 145 MMHG | HEIGHT: 68 IN | DIASTOLIC BLOOD PRESSURE: 93 MMHG | BODY MASS INDEX: 30.31 KG/M2 | HEART RATE: 97 BPM

## 2023-11-28 DIAGNOSIS — R79.89 OTHER SPECIFIED ABNORMAL FINDINGS OF BLOOD CHEMISTRY: ICD-10-CM

## 2023-11-28 DIAGNOSIS — Z80.0 FAMILY HISTORY OF MALIGNANT NEOPLASM OF DIGESTIVE ORGANS: ICD-10-CM

## 2023-11-28 DIAGNOSIS — Z78.9 OTHER SPECIFIED HEALTH STATUS: ICD-10-CM

## 2023-11-28 PROCEDURE — 99203 OFFICE O/P NEW LOW 30 MIN: CPT

## 2023-11-28 RX ORDER — FLUTICASONE PROPIONATE 50 UG/1
50 SPRAY, METERED NASAL DAILY
Qty: 1 | Refills: 0 | Status: DISCONTINUED | COMMUNITY
Start: 2018-02-12 | End: 2023-11-28

## 2023-11-28 RX ORDER — AZELASTINE HYDROCHLORIDE 137 UG/1
0.1 SPRAY, METERED NASAL TWICE DAILY
Qty: 1 | Refills: 0 | Status: DISCONTINUED | COMMUNITY
Start: 2018-02-12 | End: 2023-11-28

## 2023-11-28 RX ORDER — FLUTICASONE PROPIONATE 250 UG/1
250 POWDER, METERED RESPIRATORY (INHALATION) TWICE DAILY
Qty: 1 | Refills: 5 | Status: DISCONTINUED | COMMUNITY
Start: 2021-10-19 | End: 2023-11-28

## 2023-11-28 RX ORDER — PREDNISONE 10 MG/1
10 TABLET ORAL
Qty: 42 | Refills: 0 | Status: DISCONTINUED | COMMUNITY
Start: 2021-10-19 | End: 2023-11-28

## 2023-11-28 RX ORDER — EPINEPHRINE 0.3 MG/.3ML
0.3 INJECTION INTRAMUSCULAR
Qty: 2 | Refills: 0 | Status: DISCONTINUED | COMMUNITY
Start: 2019-03-16 | End: 2023-11-28

## 2024-01-16 ENCOUNTER — APPOINTMENT (OUTPATIENT)
Dept: UROLOGY | Facility: CLINIC | Age: 34
End: 2024-01-16

## 2024-01-23 ENCOUNTER — NON-APPOINTMENT (OUTPATIENT)
Age: 34
End: 2024-01-23

## 2024-11-09 ENCOUNTER — NON-APPOINTMENT (OUTPATIENT)
Age: 34
End: 2024-11-09

## 2024-11-17 ENCOUNTER — NON-APPOINTMENT (OUTPATIENT)
Age: 34
End: 2024-11-17

## 2025-04-24 ENCOUNTER — RX ONLY (RX ONLY)
Age: 35
End: 2025-04-24

## 2025-04-24 ENCOUNTER — OFFICE (OUTPATIENT)
Dept: URBAN - METROPOLITAN AREA CLINIC 100 | Facility: CLINIC | Age: 35
Setting detail: OPHTHALMOLOGY
End: 2025-04-24
Payer: COMMERCIAL

## 2025-04-24 DIAGNOSIS — H43.393: ICD-10-CM

## 2025-04-24 DIAGNOSIS — H52.7: ICD-10-CM

## 2025-04-24 PROCEDURE — 92015 DETERMINE REFRACTIVE STATE: CPT | Performed by: OPHTHALMOLOGY

## 2025-04-24 PROCEDURE — 92004 COMPRE OPH EXAM NEW PT 1/>: CPT | Performed by: OPHTHALMOLOGY

## 2025-04-24 ASSESSMENT — KERATOMETRY
OD_AXISANGLE_DEGREES: 036
OD_K2POWER_DIOPTERS: 42.50
OS_AXISANGLE_DEGREES: 139
OD_K1POWER_DIOPTERS: 42.00
OS_K1POWER_DIOPTERS: 42.00
OS_K2POWER_DIOPTERS: 42.75

## 2025-04-24 ASSESSMENT — REFRACTION_MANIFEST
OS_SPHERE: PLANO
OD_VA1: 20/20
OS_VA1: 20/20
OS_AXIS: 070
OD_CYLINDER: -0.50
OD_CYLINDER: -0.50
OS_CYLINDER: -0.75
OS_AXIS: 070
OS_SPHERE: PLANO
OS_CYLINDER: -0.75
OD_SPHERE: PLANO
OD_VA1: 20/20
OD_AXIS: 110
OS_VA1: 20/20
OD_AXIS: 110
OD_SPHERE: PLANO

## 2025-04-24 ASSESSMENT — VISUAL ACUITY
OS_BCVA: 20/20-1
OD_BCVA: 20/20-1

## 2025-04-24 ASSESSMENT — CONFRONTATIONAL VISUAL FIELD TEST (CVF)
OD_FINDINGS: FULL
OS_FINDINGS: FULL

## 2025-04-24 ASSESSMENT — REFRACTION_AUTOREFRACTION
OS_SPHERE: +0.25
OD_CYLINDER: -0.50
OD_SPHERE: +0.25
OS_AXIS: 069
OS_CYLINDER: -1.00
OD_AXIS: 109

## 2025-04-24 ASSESSMENT — TONOMETRY
OD_IOP_MMHG: 14
OS_IOP_MMHG: 14

## 2025-04-29 ENCOUNTER — OFFICE (OUTPATIENT)
Dept: URBAN - METROPOLITAN AREA CLINIC 104 | Facility: CLINIC | Age: 35
Setting detail: OPHTHALMOLOGY
End: 2025-04-29
Payer: COMMERCIAL

## 2025-04-29 DIAGNOSIS — G43.109: ICD-10-CM

## 2025-04-29 DIAGNOSIS — H53.143: ICD-10-CM

## 2025-04-29 PROBLEM — H01.005 BLEPHARITIS; RIGHT LOWER LID, LEFT LOWER LID: Status: ACTIVE | Noted: 2025-04-29

## 2025-04-29 PROBLEM — H52.7 REFRACTIVE ERROR: Status: ACTIVE | Noted: 2025-04-24

## 2025-04-29 PROBLEM — H43.393 VITREOUS FLOATERS; BOTH EYES: Status: ACTIVE | Noted: 2025-04-24

## 2025-04-29 PROBLEM — H01.002 BLEPHARITIS; RIGHT LOWER LID, LEFT LOWER LID: Status: ACTIVE | Noted: 2025-04-29

## 2025-04-29 PROCEDURE — 99213 OFFICE O/P EST LOW 20 MIN: CPT | Performed by: SPECIALIST

## 2025-04-29 ASSESSMENT — LID EXAM ASSESSMENTS
OD_BLEPHARITIS: RLL 1+
OS_BLEPHARITIS: LLL 1+

## 2025-04-29 ASSESSMENT — REFRACTION_AUTOREFRACTION
OD_CYLINDER: -0.50
OS_AXIS: 058
OS_CYLINDER: -1.00
OD_AXIS: 105
OS_SPHERE: +0.75
OD_SPHERE: +0.75

## 2025-04-29 ASSESSMENT — TONOMETRY
OS_IOP_MMHG: 18
OD_IOP_MMHG: 18

## 2025-04-29 ASSESSMENT — CONFRONTATIONAL VISUAL FIELD TEST (CVF)
OD_FINDINGS: FULL
OS_FINDINGS: FULL

## 2025-04-29 ASSESSMENT — SUPERFICIAL PUNCTATE KERATITIS (SPK)
OD_SPK: ABSENT
OS_SPK: ABSENT

## 2025-04-29 ASSESSMENT — VISUAL ACUITY
OS_BCVA: 20/20
OD_BCVA: 20/20

## 2025-05-07 ENCOUNTER — EMERGENCY (EMERGENCY)
Facility: HOSPITAL | Age: 35
LOS: 1 days | End: 2025-05-07
Attending: EMERGENCY MEDICINE
Payer: COMMERCIAL

## 2025-05-07 VITALS
OXYGEN SATURATION: 98 % | DIASTOLIC BLOOD PRESSURE: 102 MMHG | SYSTOLIC BLOOD PRESSURE: 180 MMHG | HEART RATE: 81 BPM | TEMPERATURE: 98 F | WEIGHT: 205.69 LBS | RESPIRATION RATE: 16 BRPM | HEIGHT: 68 IN

## 2025-05-07 VITALS — DIASTOLIC BLOOD PRESSURE: 81 MMHG | SYSTOLIC BLOOD PRESSURE: 120 MMHG

## 2025-05-07 PROCEDURE — 70551 MRI BRAIN STEM W/O DYE: CPT

## 2025-05-07 PROCEDURE — 70551 MRI BRAIN STEM W/O DYE: CPT | Mod: 26

## 2025-05-07 PROCEDURE — 99284 EMERGENCY DEPT VISIT MOD MDM: CPT | Mod: 25

## 2025-05-07 PROCEDURE — 96374 THER/PROPH/DIAG INJ IV PUSH: CPT

## 2025-05-07 PROCEDURE — 99284 EMERGENCY DEPT VISIT MOD MDM: CPT

## 2025-05-07 RX ORDER — METOCLOPRAMIDE HCL 10 MG
10 TABLET ORAL ONCE
Refills: 0 | Status: COMPLETED | OUTPATIENT
Start: 2025-05-07 | End: 2025-05-07

## 2025-05-07 RX ADMIN — Medication 10 MILLIGRAM(S): at 20:29

## 2025-05-07 NOTE — ED PROVIDER NOTE - PATIENT PORTAL LINK FT
You can access the FollowMyHealth Patient Portal offered by  by registering at the following website: [email or web address]. By joining [employer]’s FollowMyHealth portal, you will also be able to view your health information using other applications (apps) compatible with our system.

## 2025-05-07 NOTE — ED PROVIDER NOTE - NSFOLLOWUPINSTRUCTIONS_ED_ALL_ED_FT
Patient education: Headaches in adults (The Basics)  Written by the doctors and editors at [facility]  Please read the Disclaimer at the end of this page.    Are there different types of headaches?    Yes. There are different types of headaches. They include:    ?Primary headaches – "Primary" means the headache is the main problem, not a symptom of another condition. Most primary headaches fall into 1 of these categories:    •Tension headaches – These cause pressure or tightness on both sides of the head. This is the most common type.    •Migraine headaches – Migraine is a condition that involves a headache as well as other symptoms. Migraine headaches, or "attacks," often start mild and then get worse. They often affect just 1 side of the head. The pain often feels like it is pounding or throbbing. Routine activities like walking or climbing stairs can make the headache worse. Migraine can also cause nausea or vomiting, or make you sensitive to light and sound.    •Cluster headaches – These affect 1 side of the head. They start quickly, happen frequently, and are very painful. They also cause other symptoms on the same side of the face where the headache is. For example, you might get a droopy or watery eye, a stuffy nose, or facial sweating on 1 side.    ?Secondary headaches – "Secondary" means the headache is related to another health problem. For example, infections, illnesses, or medicines can cause headaches. Your doctor or nurse will help figure out if your headache is related to another condition.    What might cause primary headaches?    Some people find their headaches are triggered by certain foods or things they do. To get an idea of what might be causing your headaches, you can keep a "headache diary." This involves writing down every time you have a headache and what you ate and did before it started.    Some common headache triggers include:    ?Stress    ?Skipping meals or eating too little    ?Having too little or too much caffeine    ?Sleeping too much or too little    ?Drinking alcohol    ?Certain foods or drinks    ?Not drinking enough water    You can also write down what medicine you took for the headache and whether or not it helped.    Will I need tests?    It depends. Your doctor or nurse will ask you questions about the pattern of your headaches and do an exam.    If your doctor or nurse thinks your headache might be related to another health problem, they might do tests. These might include a CT scan, MRI, or other imaging tests. (Imaging tests create pictures of the inside of the body.)    How can I prevent getting headaches?    If you know what things trigger your headache, avoid them if possible. For example, it might help to:    ?Change your eating or sleeping patterns.    ?Learn relaxation techniques and healthy ways to manage stress.    ?Make healthy lifestyle changes, like quitting smoking and getting more physical activity.    If your headaches are frequent, severe, or long-lasting, your doctor can suggest ways to try to prevent them. In some cases, medicines can also help.    How are headaches treated?    There are lots of medicines that can relieve a headache. The right medicine for you depends on what type of headaches you get, how often you get them, and how bad they are. Some medicines treat headaches, and others are taken every day to try to prevent headaches.    Some medicines to treat common headaches (such as tension-type or migraine headaches) are available without a prescription. These include:    ?Acetaminophen (sample brand name: Tylenol)    ?Ibuprofen (sample brand names: Advil, Motrin)    ?Naproxen (sample brand name: Aleve)    There are prescription medicines that can help with pain, too.    If you get headaches often, work with your doctor to find a treatment that helps. Do not try to manage frequent headaches on your own with non-prescription pain medicines. Taking these too often can actually cause more headaches later.    What else can I do on my own?    It might help to rest in a cool, dark, quiet room. This works best for migraine attacks. It might help to put a cold compress on your head or neck.    When should I call the doctor?    Call for an ambulance (in the [place] and [place], call 9-1-1) if:    ?Your headache starts suddenly, quickly becomes severe, or could be described as "the worst headache of your life."    ?You also have a seizure, personality changes, or confusion, or you pass out.    ?You have weakness, numbness, trouble speaking, or trouble seeing. (Migraine can sometimes cause these symptoms, but you should be seen right away the first time these symptoms happen.)    Call your doctor or nurse if:    ?You get frequent or severe headaches.    ?Your headache began after you exercised or had a minor injury.    ?You have new headaches, especially if you are pregnant or older than 50.    ?You have a fever or stiff neck with your headache.

## 2025-05-07 NOTE — ED PROVIDER NOTE - ATTENDING APP SHARED VISIT CONTRIBUTION OF CARE
35yoM with no SPMHx presents to the ED with complaint of visual disturbances, Pt states that for the last few wks he has been having visual disturbances that he describes and seeing floaters, and lights, and being sensitive to lights. this usually follows with pressure behind his eyes and a slight  frontal headache.  Pt states that nothing makes symptoms better or worse by anything. Pt states that he was seen in multiple opthomolgist who found nothing wrong, and refereed him to neuro but he cant get a appointment will next month. notes that had his eyes dilated by 3 different optholmologist in the last 2 weeks - was milagro no abnormalities. Pt states that he saw flashes of lights today prompting him to come to the ED. Pt states that flashes have since resolved. no blurry vision no double vision mild pressure ha.  Denies f/c/n/v/cp/sob/palpitations/ cough/rash/dizziness/abd.pain/d/c/dysuria/hematuria. was prescribed sumatriptan and told it could be a migraine by neuroophalmologist but has not tried the meds yet.    Head: atraumatic, normacephalic  Face: atraumatic, no crepitus no orbiral/maxillary/mandibular ttp  throat: uvula midline no exudates  eyes: perrla eomi  heart: rrr s1s2  lungs: ctab  abd: soft, nt nd +bs no rebound/guarding no cva ttp  skin: warm  LE: no swelling, no calf ttp  back: no midline cervical/thoracic/lumbar ttp  NEURO: aaox3 cn iii-xii intact no visual field deficit strength 5/5 normal gait nonrmal finger to nose    possible migraine well appearing VS stable no deficits currently  will give reglan fluids MRI brain- dc with outpatient neuro follow up

## 2025-05-07 NOTE — ED PROVIDER NOTE - ADDITIONAL NOTES AND INSTRUCTIONS:
PT was evaluated At [facility] ED and was found to have a condition that warranted time of to rest and heal from WORK/SCHOOL.   Margarito Elkins PA-C

## 2025-05-07 NOTE — ED ADULT NURSE NOTE - CAS ELECT INFOMATION PROVIDED
Pt seen, treated, medicated and discharged by MD Wilson and NEIL Elkins prior to RN assessment./DC instructions

## 2025-05-07 NOTE — ED PROVIDER NOTE - CARE PROVIDER_API CALL
Joey Jensen  Neurology  [address] [place] [address], [facility] [address]  [place] [address]  Phone: [phone]  Fax: [phone]  Follow Up Time:

## 2025-05-07 NOTE — ED PROVIDER NOTE - OBJECTIVE STATEMENT
PT with no SPMHx presents to the ED with complaint of visual disturbances, Pt states that for the last few wks he has been having visual disturbances that he describes and seeing floaters, and lights, and being sensitive to lights. Pt states that nothing makes symptoms better or worse by anything. Pt states that he was seen in multiple opthomolgist who found nothing wrong, and refereed him to neuro but he cant get a appointment will next month. Pt states that he saw flashes of lights today prompting him to come to the ED. Pt states that flashes have since resolved. Pt dines fever, chills, weakness, loss of vision, N.V, rash, cough, dizziness, SOB, weakness, nasal discharge.

## 2025-05-07 NOTE — ED PROVIDER NOTE - CLINICAL SUMMARY MEDICAL DECISION MAKING FREE TEXT BOX
PT with no SPMHx presents to the ED with complaint of visual disturbances, Pt states that for the last few wks he has been having visual disturbances that he describes and seeing floaters, and lights, and being sensitive to lights. Pt states that nothing makes symptoms better or worse by anything. Pt states that he was seen in multiple opthomolgist who found nothing wrong, and refereed him to neuro but he cant get a appointment will next month. Pt states that he saw flashes of lights today prompting him to come to the ED. Pt states that flashes have since resolved. Pt dines fever, chills, weakness, loss of vision, N.V, rash, cough, dizziness, SOB, weakness, nasal discharge. PT with no SPMHx presents to the ED with complaint of visual disturbances, Pt states that for the last few wks he has been having visual disturbances that he describes and seeing floaters, and lights, and being sensitive to lights. Pt states that nothing makes symptoms better or worse by anything. Pt states that he was seen in multiple opthomolgist who found nothing wrong, and refereed him to neuro but he cant get a appointment will next month. Pt states that he saw flashes of lights today prompting him to come to the ED. Pt states that flashes have since resolved. Pt dines fever, chills, weakness, loss of vision, N.V, rash, cough, dizziness, SOB, weakness, nasal discharge. Pt neuro vasc itnact, no acute findings on exam or imaging, Pt with good response to meds in the ED, sig clinical improvement,  pt cleared for dc home with supportive care, pt has Rt of sumatriptan from out pt he can use. Pt educated about when to return to the ED if needed. PT verbalizes that he understands all instructions and results. Pt informed that ED is open and available 24/7 365 days a yr, encouraged to return to the ED if they have any change in condition, or feel the need for revaluation.

## 2025-06-09 ENCOUNTER — NON-APPOINTMENT (OUTPATIENT)
Age: 35
End: 2025-06-09

## 2025-06-09 ENCOUNTER — APPOINTMENT (OUTPATIENT)
Dept: NEUROLOGY | Facility: CLINIC | Age: 35
End: 2025-06-09
Payer: COMMERCIAL

## 2025-06-09 VITALS
HEIGHT: 68 IN | HEART RATE: 93 BPM | WEIGHT: 190 LBS | OXYGEN SATURATION: 98 % | SYSTOLIC BLOOD PRESSURE: 136 MMHG | DIASTOLIC BLOOD PRESSURE: 70 MMHG | BODY MASS INDEX: 28.79 KG/M2

## 2025-06-09 PROCEDURE — 99204 OFFICE O/P NEW MOD 45 MIN: CPT

## 2025-06-09 RX ORDER — ALPRAZOLAM 0.5 MG/1
0.5 TABLET ORAL DAILY
Refills: 0 | Status: ACTIVE | COMMUNITY